# Patient Record
Sex: FEMALE | Race: WHITE | NOT HISPANIC OR LATINO | Employment: PART TIME
[De-identification: names, ages, dates, MRNs, and addresses within clinical notes are randomized per-mention and may not be internally consistent; named-entity substitution may affect disease eponyms.]

---

## 2017-05-26 ENCOUNTER — HEALTH MAINTENANCE LETTER (OUTPATIENT)
Age: 57
End: 2017-05-26

## 2017-06-22 ENCOUNTER — TRANSFERRED RECORDS (OUTPATIENT)
Dept: HEALTH INFORMATION MANAGEMENT | Facility: CLINIC | Age: 57
End: 2017-06-22

## 2018-06-30 ENCOUNTER — HOSPITAL ENCOUNTER (EMERGENCY)
Facility: CLINIC | Age: 58
Discharge: HOME | End: 2018-06-30
Attending: EMERGENCY MEDICINE | Admitting: EMERGENCY MEDICINE
Payer: COMMERCIAL

## 2018-06-30 VITALS
DIASTOLIC BLOOD PRESSURE: 95 MMHG | TEMPERATURE: 96.8 F | OXYGEN SATURATION: 98 % | HEIGHT: 64 IN | WEIGHT: 120 LBS | HEART RATE: 95 BPM | SYSTOLIC BLOOD PRESSURE: 180 MMHG | RESPIRATION RATE: 17 BRPM | BODY MASS INDEX: 20.49 KG/M2

## 2018-06-30 DIAGNOSIS — S61.211A LACERATION OF LEFT INDEX FINGER WITHOUT FOREIGN BODY WITHOUT DAMAGE TO NAIL, INITIAL ENCOUNTER: ICD-10-CM

## 2018-06-30 PROCEDURE — 27210282 ZZH ADHESIVE DERMABOND SKIN: Performed by: EMERGENCY MEDICINE

## 2018-06-30 PROCEDURE — 12001 RPR S/N/AX/GEN/TRNK 2.5CM/<: CPT | Performed by: EMERGENCY MEDICINE

## 2018-06-30 PROCEDURE — 99283 EMERGENCY DEPT VISIT LOW MDM: CPT | Mod: 25 | Performed by: EMERGENCY MEDICINE

## 2018-06-30 PROCEDURE — 90715 TDAP VACCINE 7 YRS/> IM: CPT | Performed by: EMERGENCY MEDICINE

## 2018-06-30 PROCEDURE — 12001 RPR S/N/AX/GEN/TRNK 2.5CM/<: CPT | Mod: Z6 | Performed by: EMERGENCY MEDICINE

## 2018-06-30 PROCEDURE — 25000128 H RX IP 250 OP 636: Performed by: EMERGENCY MEDICINE

## 2018-06-30 PROCEDURE — 90471 IMMUNIZATION ADMIN: CPT | Performed by: EMERGENCY MEDICINE

## 2018-06-30 RX ADMIN — CLOSTRIDIUM TETANI TOXOID ANTIGEN (FORMALDEHYDE INACTIVATED), CORYNEBACTERIUM DIPHTHERIAE TOXOID ANTIGEN (FORMALDEHYDE INACTIVATED), BORDETELLA PERTUSSIS TOXOID ANTIGEN (GLUTARALDEHYDE INACTIVATED), BORDETELLA PERTUSSIS FILAMENTOUS HEMAGGLUTININ ANTIGEN (FORMALDEHYDE INACTIVATED), BORDETELLA PERTUSSIS PERTACTIN ANTIGEN, AND BORDETELLA PERTUSSIS FIMBRIAE 2/3 ANTIGEN 0.5 ML: 5; 2; 2.5; 5; 3; 5 INJECTION, SUSPENSION INTRAMUSCULAR at 20:55

## 2018-06-30 NOTE — ED AVS SNAPSHOT
[facility] Emergency Department    [address] [place] [address]    Phone:  [phone]    Fax:  [phone]                                       Aniceto Ray   MRN: [id number]    Department:  [facility] Emergency Department   Date of Visit:  6/30/2018           After Visit Summary Signature Page     I have received my discharge instructions, and my questions have been answered. I have discussed any challenges I see with this plan with the nurse or doctor.    ..........................................................................................................................................  Patient/Patient Representative Signature      ..........................................................................................................................................  Patient Representative Print Name and Relationship to Patient    ..................................................               ................................................  Date                                            Time    ..........................................................................................................................................  Reviewed by Signature/Title    ...................................................              ..............................................  Date                                                            Time

## 2018-06-30 NOTE — ED AVS SNAPSHOT
[facility] Emergency Department    [address] [place] [address]    Phone:  [phone]    Fax:  [phone]                                       Aniceto Ray   MRN: [id number]    Department:  [facility] Emergency Department   Date of Visit:  6/30/2018           Patient Information     Date Of Birth          1960        Your diagnoses for this visit were:     Laceration of left index finger without foreign body without damage to nail, initial encounter        You were seen by Torres Perez MD.      Follow-up Information     Please follow up.    Why:  As needed        Discharge Instructions         Extremity Laceration: Skin Glue  A laceration is a cut through the skin. You have a laceration that has been closed with skin glue. This is used on cuts that have smooth edges and are not infected. It's best used on straight, clean cuts on areas that do not get a lot of tension.  You may need a tetanus shot. This is given if you have no record of a shot, and the object that caused the cut may lead to tetanus.  Home care    Your healthcare provider may prescribe an antibiotic. This is to help prevent infection. Follow all instructions for taking this medicine. Take the medicine every day until it is gone or you are told to stop. You should not have any left over.    The healthcare provider may prescribe medicines for pain. Follow instructions for taking them.    Follow the healthcare provider s instructions on how to care for the cut.    No bandage is needed. Skin glue peels off on its own within 5 to 10 days. Most skin wounds heal within 10 days.    Keep the wound clean. You may shower or bathe as usual, but do not use soaps, lotions, or ointments on the wound area. Do not scrub the wound. After bathing, pat the wound dry with a soft towel.    Don't scratch, rub, or pick at the film. Don't place tape directly over the film.    Don't put liquids such as peroxide, ointments, or creams  on the wound while the skin glue is in place. Many oil based products can weaken and dissolve the glue.    Don't do any activities that may reinjure your wound.    Don't do any activities that cause heavy sweating. Protect the wound from sunlight.    Most skin wounds heal without problems. But an infection sometimes occurs even with proper treatment. Watch for the signs of infection listed below.  Follow-up care  Follow up as directed with your healthcare provider, or as advised.  When to seek medical advice  Call your healthcare provider right away if any of these occur:    Wound bleeding not controlled by direct pressure    Signs of infection, including increasing pain in the wound, increasing wound redness or swelling, or pus or bad odor coming from the wound    Fever of 100.4 F (38. C) or higher, or as directed by your healthcare provider    Wound edges reopen    Wound changes colors    Numbness around the wound     Decreased movement around the injured area  Date Last Reviewed: 7/1/2017 2000-2017 [employer]. [address] [place] [address]. All rights reserved. This information is not intended as a substitute for professional medical care. Always follow your healthcare professional's instructions.          24 Hour Appointment Hotline       To make an appointment at any [facility], call [phone] ([phone]). If you don't have a family doctor or clinic, we will help you find one. [facility] clinics are conveniently located to serve the needs of you and your family.             Review of your medicines      Our records show that you are taking the medicines listed below. If these are incorrect, please call your family doctor or clinic.        Dose / Directions Last dose taken    coenzyme Q-10 capsule   Dose:  1 capsule   Quantity:  30 capsule        Take 1 capsule by mouth daily.   Refills:  12        COREG 12.5 MG tablet   Dose:  12.5 mg   Generic drug:  carvedilol         Take 12.5 mg by mouth 2 times daily (with meals).   Refills:  0        folic acid 1 MG tablet   Commonly known as:  FOLVITE   Dose:  1 mg   Quantity:  100 tablet        Take 1 tablet (1 mg) by mouth daily   Refills:  6        loratadine 10 MG tablet   Commonly known as:  CLARITIN   Dose:  10 mg   Quantity:  90 tablet        Take 1 tablet by mouth daily.   Refills:  3        methotrexate 2.5 MG tablet CHEMO   Dose:  17.5 mg   Quantity:  28 tablet        Take 7 tablets (17.5 mg) by mouth once a week   Refills:  3        mometasone 0.1 % cream   Commonly known as:  ELOCON   Quantity:  60 g        Apply  Topically 1-2 times daily.   Refills:  1        MULTIVITAMIN MINERAL/BETA CAR TABS   OR        1 TABLET DAILY   Refills:  0                Orders Needing Specimen Collection     None      Pending Results     No orders found from 6/28/2018 to 7/1/2018.            Pending Culture Results     No orders found from 6/28/2018 to 7/1/2018.            Pending Results Instructions     If you had any lab results that were not finalized at the time of your Discharge, you can call the ED Lab Result RN at [phone]. You will be contacted by this team for any positive Lab results or changes in treatment. The nurses are available 7 days a week from 10A to 6:30P.  You can leave a message 24 hours per day and they will return your call.        Thank you for choosing [facility]       Thank you for choosing [facility] for your care. Our goal is always to provide you with excellent care. Hearing back from our patients is one way we can continue to improve our services. Please take a few minutes to complete the written survey that you may receive in the mail after you visit with us. Thank you!        [facility] Information     [facility] gives you secure access to your electronic health record. If you see a primary care provider, you can also send messages to your care team and make appointments. If you have questions, please call your primary  care clinic.  If you do not have a primary care provider, please call [phone] and they will assist you.        Care EveryWhere ID     This is your Care EveryWhere ID. This could be used by other organizations to access your [facility] medical records  [id number]        Equal Access to Services     ADRIANA BERNSTEIN : Christopher Perry, andre macias, devin quiles, eleonora alatorre. So [facility] [phone].    ATENCIÓN: Si habla español, tiene a gomez disposición servicios gratuitos de asistencia lingüística. Llame al [phone].    We comply with applicable federal civil rights laws and [place] laws. We do not discriminate on the basis of race, color, national origin, age, disability, sex, sexual orientation, or gender identity.            After Visit Summary       This is your record. Keep this with you and show to your community pharmacist(s) and doctor(s) at your next visit.

## 2018-07-01 NOTE — ED PROVIDER NOTES
History     Chief Complaint   Patient presents with     Laceration     The history is provided by the patient.     Aniceto Ray is a 58 year old female who presents to the ED complaining of a laceration. Patient reports she was cutting soap with a pealing knife and slipped, cutting her left 2nd digit. Patient is right handed. Her tetanus is not UTD.  She denies any paresthesias.  No other injury with the incident.  The lesion bled quite briskly initially but with pressure that has stemmed.    Problem List:    Patient Active Problem List    Diagnosis Date Noted     High risk medication use 08/15/2013     Priority: High     Psoriatic arthropathy (H) 07/25/2002     Priority: High     Perimenopausal 01/15/2011     Priority: Medium     CARDIOVASCULAR SCREENING; LDL GOAL LESS THAN 160 10/31/2010     Priority: Medium     Cardiomyopathy, idiopathic (H)      Priority: Medium     LBBB (left bundle branch block)      Priority: Medium     Congestive heart failure (H) 11/29/2009     Priority: Medium     D[facility]C 12/01/09-[facility]  Problem list name updated by automated process. Provider to review       Allergic rhinitis 05/15/2006     Priority: Medium     Problem list name updated by automated process. Provider to review       Localized osteoarthrosis 02/24/2003     Priority: Medium     Problem list name updated by automated process. Provider to review       Contact dermatitis and other eczema, due to unspecified cause      Priority: Medium        Past Medical History:    Past Medical History:   Diagnosis Date     Cardiomyopathy, idiopathic (H) 11/09     Congestive heart failure, unspecified 11/29/09     Contact dermatitis and other eczema, due to unspecified cause      CONTRACEPTIVE MANGMT NOS 2/24/2003     Heart murmur 1/10     LBBB (left bundle branch block) 11/09     Localized osteoarthrosis not specified whether primary or secondary, unspecified site 4/2002     Lymphadenopathy 11/09     Menopause 12/10     Psoriatic arthritis  "(H)      Unspecified contraceptive management        Past Surgical History:    Past Surgical History:   Procedure Laterality Date     COLONOSCOPY  8/9/2011    Procedure:COLONOSCOPY; Surgeon:MONICA RAGLAND; Location:[facility] OR     NO HISTORY OF SURGERY         Family History:    Family History   Problem Relation Age of Onset     Diabetes Paternal Grandmother      Cancer Paternal Grandmother      stomach     Cancer Maternal Grandfather      prostate     Genitourinary Problems Father      kidney disease     Arthritis Brother      Arthritis Mother        Social History:  Marital Status:   [2]  Social History   Substance Use Topics     Smoking status: Never Smoker     Smokeless tobacco: Never Used      Comment: No smokers in home     Alcohol use No        Medications:      carvedilol (COREG) 12.5 MG tablet   Coenzyme Q-10 capsule   folic acid (FOLVITE) 1 MG tablet   loratadine (CLARITIN) 10 MG tablet   methotrexate 2.5 MG tablet   mometasone (ELOCON) 0.1 % cream   MULTIVITAMIN MINERAL/BETA CAR TABS   OR         Review of Systems   All other systems reviewed and are negative.      Physical Exam   BP: (!) 180/95  Pulse: 95  Temp: 96.8  F (36  C)  Resp: 17  Height: 162.6 cm (5' 4\")  Weight: 54.4 kg (120 lb)  SpO2: 98 %      Physical Exam general alert cooperative female in mild distress.  Examination of her left hand/index finger shows a superficial skin flap is approximately 1 cm in length and U-shaped.  The fingernail is not involved.  The joint is not involved.  She has flexion extension at the MP and IP joints.    ED Course     ED Course     Procedures  Wound was cleaned.  The laceration was then surgically glued and Steri-Strip was placed for additional support.  No results found for this or any previous visit (from the past 24 hour(s)).    Medications   Tdap (tetanus-diphtheria-acell pertussis) (ADACEL) injection 0.5 mL (not administered)       Assessments & Plan (with Medical Decision Making)   Patient is a " 58-year-old female presents with laceration to her left nondominant index finger.  Sliced on a paring knife causing a flap type laceration which was described and repaired as above.  CMS is intact.  No joint involvement.  Tetanus is updated.  Information on laceration is provided.  Reasons to return for reassessment discussed.  I have reviewed the nursing notes.    I have reviewed the findings, diagnosis, plan and need for follow up with the patient.       New Prescriptions    No medications on file       Final diagnoses:   Laceration of left index finger without foreign body without damage to nail, initial encounter     This document serves as a record of services personally performed by Torres Perez MD. It was created on their behalf by Jarrett Serrano, a trained medical scribe. The creation of this record is based on the provider's personal observations and the statements of the patient. This document has been checked and approved by the attending provider.    Note: Chart documentation done in part with Dragon Voice Recognition software. Although reviewed after completion, some word and grammatical errors may remain.    6/30/2018   [facility] EMERGENCY DEPARTMENT     Torres Perez MD  06/30/18 2054       Torres Perez MD  06/30/18 2055       Torres Perez MD  06/30/18 2055

## 2018-07-01 NOTE — DISCHARGE INSTRUCTIONS
Extremity Laceration: Skin Glue  A laceration is a cut through the skin. You have a laceration that has been closed with skin glue. This is used on cuts that have smooth edges and are not infected. It's best used on straight, clean cuts on areas that do not get a lot of tension.  You may need a tetanus shot. This is given if you have no record of a shot, and the object that caused the cut may lead to tetanus.  Home care    Your healthcare provider may prescribe an antibiotic. This is to help prevent infection. Follow all instructions for taking this medicine. Take the medicine every day until it is gone or you are told to stop. You should not have any left over.    The healthcare provider may prescribe medicines for pain. Follow instructions for taking them.    Follow the healthcare provider s instructions on how to care for the cut.    No bandage is needed. Skin glue peels off on its own within 5 to 10 days. Most skin wounds heal within 10 days.    Keep the wound clean. You may shower or bathe as usual, but do not use soaps, lotions, or ointments on the wound area. Do not scrub the wound. After bathing, pat the wound dry with a soft towel.    Don't scratch, rub, or pick at the film. Don't place tape directly over the film.    Don't put liquids such as peroxide, ointments, or creams on the wound while the skin glue is in place. Many oil based products can weaken and dissolve the glue.    Don't do any activities that may reinjure your wound.    Don't do any activities that cause heavy sweating. Protect the wound from sunlight.    Most skin wounds heal without problems. But an infection sometimes occurs even with proper treatment. Watch for the signs of infection listed below.  Follow-up care  Follow up as directed with your healthcare provider, or as advised.  When to seek medical advice  Call your healthcare provider right away if any of these occur:    Wound bleeding not controlled by direct pressure    Signs of  infection, including increasing pain in the wound, increasing wound redness or swelling, or pus or bad odor coming from the wound    Fever of 100.4 F (38. C) or higher, or as directed by your healthcare provider    Wound edges reopen    Wound changes colors    Numbness around the wound     Decreased movement around the injured area  Date Last Reviewed: 7/1/2017 2000-2017 [employer]. [address] [place] [address]. All rights reserved. This information is not intended as a substitute for professional medical care. Always follow your healthcare professional's instructions.

## 2018-07-13 ENCOUNTER — HOSPITAL ENCOUNTER (EMERGENCY)
Facility: CLINIC | Age: 58
Discharge: HOME | End: 2018-07-13
Attending: FAMILY MEDICINE | Admitting: FAMILY MEDICINE
Payer: COMMERCIAL

## 2018-07-13 ENCOUNTER — APPOINTMENT (OUTPATIENT)
Dept: GENERAL RADIOLOGY | Facility: CLINIC | Age: 58
End: 2018-07-13
Attending: FAMILY MEDICINE
Payer: COMMERCIAL

## 2018-07-13 VITALS
OXYGEN SATURATION: 96 % | DIASTOLIC BLOOD PRESSURE: 93 MMHG | RESPIRATION RATE: 18 BRPM | HEART RATE: 107 BPM | TEMPERATURE: 97.8 F | SYSTOLIC BLOOD PRESSURE: 166 MMHG
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
## 2018-07-13 DIAGNOSIS — M25.511 ACUTE PAIN OF RIGHT SHOULDER: ICD-10-CM

## 2018-07-13 PROCEDURE — 99285 EMERGENCY DEPT VISIT HI MDM: CPT | Mod: Z6 | Performed by: FAMILY MEDICINE

## 2018-07-13 PROCEDURE — 73030 X-RAY EXAM OF SHOULDER: CPT | Mod: TC,RT

## 2018-07-13 PROCEDURE — 71046 X-RAY EXAM CHEST 2 VIEWS: CPT | Mod: TC

## 2018-07-13 PROCEDURE — 99284 EMERGENCY DEPT VISIT MOD MDM: CPT | Performed by: FAMILY MEDICINE

## 2018-07-13 NOTE — ED AVS SNAPSHOT
[facility] Emergency Department    [address] [place] [address]    Phone:  [phone]    Fax:  [phone]                                       Aniceto Ray   MRN: [id number]    Department:  [facility] Emergency Department   Date of Visit:  7/13/2018           After Visit Summary Signature Page     I have received my discharge instructions, and my questions have been answered. I have discussed any challenges I see with this plan with the nurse or doctor.    ..........................................................................................................................................  Patient/Patient Representative Signature      ..........................................................................................................................................  Patient Representative Print Name and Relationship to Patient    ..................................................               ................................................  Date                                            Time    ..........................................................................................................................................  Reviewed by Signature/Title    ...................................................              ..............................................  Date                                                            Time
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
## 2018-07-13 NOTE — ED NOTES
Pt returns from xray.  No complaints.  Family present.  VSS.  Waiting for xray results, MD maguire, and dispo plan.
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
## 2018-08-01 ENCOUNTER — RADIANT APPOINTMENT (OUTPATIENT)
Dept: MAMMOGRAPHY | Facility: OTHER | Age: 58
End: 2018-08-01
Payer: COMMERCIAL

## 2018-08-01 DIAGNOSIS — Z12.31 VISIT FOR SCREENING MAMMOGRAM: ICD-10-CM

## 2018-08-01 PROCEDURE — 77067 SCR MAMMO BI INCL CAD: CPT | Mod: TC

## 2019-09-27 ENCOUNTER — HEALTH MAINTENANCE LETTER (OUTPATIENT)
Age: 59
End: 2019-09-27

## 2021-01-09 ENCOUNTER — HEALTH MAINTENANCE LETTER (OUTPATIENT)
Age: 61
End: 2021-01-09

## 2021-10-23 ENCOUNTER — HEALTH MAINTENANCE LETTER (OUTPATIENT)
Age: 61
End: 2021-10-23

## 2022-02-12 ENCOUNTER — HEALTH MAINTENANCE LETTER (OUTPATIENT)
Age: 62
End: 2022-02-12

## 2022-10-09 ENCOUNTER — HEALTH MAINTENANCE LETTER (OUTPATIENT)
Age: 62
End: 2022-10-09

## 2023-01-01 ENCOUNTER — HOSPITAL ENCOUNTER (EMERGENCY)
Facility: CLINIC | Age: 63
Discharge: HOME | End: 2023-01-01
Attending: EMERGENCY MEDICINE | Admitting: EMERGENCY MEDICINE
Payer: COMMERCIAL

## 2023-01-01 ENCOUNTER — APPOINTMENT (OUTPATIENT)
Dept: GENERAL RADIOLOGY | Facility: CLINIC | Age: 63
End: 2023-01-01
Attending: EMERGENCY MEDICINE
Payer: COMMERCIAL

## 2023-01-01 VITALS
OXYGEN SATURATION: 100 % | BODY MASS INDEX: 20.66 KG/M2 | HEIGHT: 64 IN | SYSTOLIC BLOOD PRESSURE: 168 MMHG | WEIGHT: 121 LBS | TEMPERATURE: 98.3 F | RESPIRATION RATE: 20 BRPM | HEART RATE: 110 BPM | DIASTOLIC BLOOD PRESSURE: 91 MMHG

## 2023-01-01 DIAGNOSIS — S52.532A CLOSED COLLES' FRACTURE OF LEFT RADIUS, INITIAL ENCOUNTER: Primary | ICD-10-CM

## 2023-01-01 PROCEDURE — 73110 X-RAY EXAM OF WRIST: CPT | Mod: LT

## 2023-01-01 PROCEDURE — 25600 CLTX DST RDL FX/EPHYS SEP WO: CPT | Mod: LT | Performed by: EMERGENCY MEDICINE

## 2023-01-01 PROCEDURE — 25600 CLTX DST RDL FX/EPHYS SEP WO: CPT | Mod: 54 | Performed by: EMERGENCY MEDICINE

## 2023-01-01 PROCEDURE — 99284 EMERGENCY DEPT VISIT MOD MDM: CPT | Mod: 25 | Performed by: EMERGENCY MEDICINE

## 2023-01-01 RX ORDER — HYDROCODONE BITARTRATE AND ACETAMINOPHEN 5; 325 MG/1; MG/1
1 TABLET ORAL EVERY 6 HOURS PRN
Qty: 12 TABLET | Refills: 0 | Status: SHIPPED | OUTPATIENT
Start: 2023-01-01

## 2023-01-01 RX ORDER — OXYCODONE HYDROCHLORIDE 5 MG/1
5 TABLET ORAL EVERY 6 HOURS PRN
Qty: 12 TABLET | Refills: 0 | Status: SHIPPED | OUTPATIENT
Start: 2023-01-01

## 2023-01-01 RX ORDER — APREMILAST 30 MG/1
30 TABLET, FILM COATED ORAL 2 TIMES DAILY
COMMUNITY
Start: 2022-10-07

## 2023-01-01 RX ORDER — NAPROXEN SODIUM 220 MG
220 TABLET ORAL 2 TIMES DAILY WITH MEALS
COMMUNITY

## 2023-01-01 ASSESSMENT — ACTIVITIES OF DAILY LIVING (ADL): ADLS_ACUITY_SCORE: 35

## 2023-01-01 NOTE — LETTER
January 1, 2023      To Whom It May Concern:      Aniceto Ray was seen in our Emergency Department today, 01/01/23.  I expect her condition to improve over the next 4 weeks.  She may return to work when improved but must rest the left arm until healed. .    Sincerely,        Pb Otero MD
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
## 2023-01-02 ENCOUNTER — TELEPHONE (OUTPATIENT)
Dept: ORTHOPEDICS | Facility: OTHER | Age: 63
End: 2023-01-02
Payer: COMMERCIAL

## 2023-01-02 NOTE — ED PROVIDER NOTES
History     Chief Complaint   Patient presents with     Arm Injury     HPI  Aniceto Ray is a 62 year old female who presents to the emergency department secondary to a left wrist injury.  A couple hours prior to arrival she was walking on the steps and slipped on some ice and fell on an outstretched left hand.  She sustained an injury to her wrist.  No other injuries.  Her elbow and shoulder feel fine.  No head injury or loss of consciousness.  She has swelling over the dorsal aspect of the left wrist on the radial side and she tried ice and time but it did not improve.    Allergies:  Allergies   Allergen Reactions     Ace Inhibitors Swelling     Lisinopril    Hospitalized -lip and facial swelling     Penicillins      ?       Problem List:    Patient Active Problem List    Diagnosis Date Noted     High risk medication use 08/15/2013     Priority: High     Psoriatic arthropathy (H) 07/25/2002     Priority: High     Perimenopausal 01/15/2011     Priority: Medium     CARDIOVASCULAR SCREENING; LDL GOAL LESS THAN 160 10/31/2010     Priority: Medium     Cardiomyopathy, idiopathic (H)      Priority: Medium     LBBB (left bundle branch block)      Priority: Medium     Congestive heart failure (H) 11/29/2009     Priority: Medium     D/C 12/01/09-[facility]  Problem list name updated by automated process. Provider to review       Allergic rhinitis 05/15/2006     Priority: Medium     Problem list name updated by automated process. Provider to review       Localized osteoarthrosis 02/24/2003     Priority: Medium     Problem list name updated by automated process. Provider to review       Contact dermatitis and other eczema, due to unspecified cause      Priority: Medium        Past Medical History:    Past Medical History:   Diagnosis Date     Cardiomyopathy, idiopathic (H) 11/09     Congestive heart failure, unspecified 11/29/09     Contact dermatitis and other eczema, due to unspecified cause      CONTRACEPTIVE MANGMT NOS  "2/24/2003     Heart murmur 1/10     LBBB (left bundle branch block) 11/09     Localized osteoarthrosis not specified whether primary or secondary, unspecified site 4/2002     Lymphadenopathy 11/09     Menopause 12/10     Psoriatic arthritis (H)      Unspecified contraceptive management        Past Surgical History:    Past Surgical History:   Procedure Laterality Date     COLONOSCOPY  8/9/2011    Procedure:COLONOSCOPY; Surgeon:MONICA RAGLAND; Location:[facility] OR     NO HISTORY OF SURGERY         Family History:    Family History   Problem Relation Age of Onset     Diabetes Paternal Grandmother      Cancer Paternal Grandmother         stomach     Cancer Maternal Grandfather         prostate     Genitourinary Problems Father         kidney disease     Arthritis Brother      Arthritis Mother        Social History:  Marital Status:   [2]  Social History     Tobacco Use     Smoking status: Never     Smokeless tobacco: Never     Tobacco comments:     No smokers in home   Substance Use Topics     Alcohol use: No     Drug use: No        Medications:    apremilast (OTEZLA) 30 MG tablet  Coenzyme Q-10 capsule  HYDROcodone-acetaminophen (NORCO) 5-325 MG tablet  MULTIVITAMIN MINERAL/BETA CAR TABS   OR  naproxen sodium (ANAPROX) 220 MG tablet  oxyCODONE (ROXICODONE) 5 MG tablet          Review of Systems   All other systems reviewed and are negative.      Physical Exam   BP: (!) 195/100  Pulse: 112  Temp: 98.3  F (36.8  C)  Resp: 20  Height: 162.6 cm (5' 4\")  Weight: 54.9 kg (121 lb)  SpO2: 99 %      Physical Exam  Vitals and nursing note reviewed.   Constitutional:       General: She is not in acute distress.     Appearance: She is well-developed. She is not diaphoretic.   HENT:      Head: Normocephalic and atraumatic.      Nose: Nose normal.   Eyes:      General: No scleral icterus.     Extraocular Movements: Extraocular movements intact.      Conjunctiva/sclera: Conjunctivae normal.      Pupils: Pupils are equal, round, " and reactive to light.   Cardiovascular:      Rate and Rhythm: Normal rate and regular rhythm.   Pulmonary:      Effort: Pulmonary effort is normal.      Breath sounds: Normal breath sounds.   Musculoskeletal:         General: Swelling and tenderness present. No deformity or signs of injury.      Cervical back: Normal range of motion and neck supple.      Right lower leg: No edema.      Left lower leg: No edema.      Comments: There is swelling and tenderness over the distal dorsal aspect of the left wrist.  There is decreased range of motion with flexion extension of the wrist.  No numbness or tingling in the fingertips.  Normal sensation.  Normal range of motion of the elbow and shoulder.   Skin:     General: Skin is warm and dry.      Coloration: Skin is not pale.      Findings: No erythema or rash.   Neurological:      General: No focal deficit present.      Mental Status: She is alert.   Psychiatric:         Mood and Affect: Mood normal.         ED Course                 Procedures           Results for orders placed or performed during the hospital encounter of 01/01/23 (from the past 24 hour(s))   XR Wrist Left G/E 3 Views    Narrative    EXAM: XR WRIST LEFT G/E 3 VIEWS  LOCATION: [facility]  DATE/TIME: 1/1/2023 6:34 PM    INDICATION: Fall on outstretched hand. Pain.  COMPARISON: None.      Impression    IMPRESSION: There is an acute minimally displaced and impacted fracture of the distal radius. Tiny ulnar styloid process fracture. Moderate degenerative changes of the thumb CMC and STT articulations. Osteopenia.      NOTE: ABNORMAL REPORT    THE DICTATION ABOVE DESCRIBES AN ABNORMALITY FOR WHICH FOLLOW-UP IS NEEDED.        Medications - No data to display    Assessments & Plan (with Medical Decision Making)  62-year-old female who fell on an outstretched left hand and sustained an injury to her left wrist.  X-ray performed which showed: a non displaced distal radius fracture  and ulnar styloid fracture.  Fracture is nondisplaced and therefore was not reduced.  I discussed with the diagnosis, treatment options, risks and follow-up with the patient who agrees with the plan.  Patient was placed in a sugar-tong splint with a sling.  Oxycodone given for pain.  Patient was warned regarding its use.     I have reviewed the nursing notes.    I have reviewed the findings, diagnosis, plan and need for follow up with the patient.      Medical Decision Making  The patient presented with a problem that is acute and uncomplicated.    The patient's evaluation involved:  ordering and review of 1 test(s) (see separate area of note for details)    The patient's management involved prescription drug management.        Discharge Medication List as of 1/1/2023  8:17 PM      START taking these medications    Details   oxyCODONE (ROXICODONE) 5 MG tablet Take 1 tablet (5 mg) by mouth every 6 hours as needed for severe pain (7-10), Disp-12 tablet, R-0, [facility]             Final diagnoses:   Closed Colles' fracture of left radius, initial encounter       1/1/2023   [facility] EMERGENCY DEPT     Pb Otero MD  01/01/23 [id number]

## 2023-01-02 NOTE — DISCHARGE INSTRUCTIONS
Please return to the ER if new or worsening symptoms for re-evaluation. I hope you get better quickly.   Use the splint and sling for comfort.  Take the oxycodone as directed.  No driving if taking the oxycodone.  Drink plenty of water and avoid constipation with oxycodone.  Follow-up with orthopedics in about a week.  I put in a referral.

## 2023-01-02 NOTE — TELEPHONE ENCOUNTER
Orthopedic/Sports Medicine Fracture Triage    Incoming call/or message from call center member.    Fracture type: Wrist.    The patient is in a  splint.    Date of injury 1/1/2023.    Triaged by: Horace Patel ATC.    Determined to be managed Non operatively.  Reviewed imaging, appears to minimally displaced distal radius fracture - not true Colles fracture as diagnosed.    Needs to be seen within 1 week.    Additional Comments/information: Patient ok to scheduled with Santhosh Medina PA-C at [facility] on 1/3/23.  Multiple openings available at this time.  Scheduling team please reach out to patient.    Horace Patel ATC

## 2023-01-02 NOTE — TELEPHONE ENCOUNTER
[facility] Call Center    Phone Message    May a detailed message be left on voicemail: yes     Reason for Call: Other: Please see referral for Closed Colles' fracture of left radius, /please advise on when appropriate to be seen. Nothing open in ER or PH until next week pt did not want to schedule that far out said she thought she should be seen sooner     Action Taken: Other: ortho     Travel Screening: Not Applicable

## 2023-02-12 ENCOUNTER — HEALTH MAINTENANCE LETTER (OUTPATIENT)
Age: 63
End: 2023-02-12

## 2024-03-16 ENCOUNTER — HEALTH MAINTENANCE LETTER (OUTPATIENT)
Age: 64
End: 2024-03-16

## 2025-02-22 ENCOUNTER — HEALTH MAINTENANCE LETTER (OUTPATIENT)
Age: 65
End: 2025-02-22

## 2025-03-22 ENCOUNTER — HEALTH MAINTENANCE LETTER (OUTPATIENT)
Age: 65
End: 2025-03-22

## 2025-07-05 ENCOUNTER — HEALTH MAINTENANCE LETTER (OUTPATIENT)
Age: 65
End: 2025-07-05